# Patient Record
Sex: FEMALE | Race: WHITE | NOT HISPANIC OR LATINO | URBAN - NONMETROPOLITAN AREA
[De-identification: names, ages, dates, MRNs, and addresses within clinical notes are randomized per-mention and may not be internally consistent; named-entity substitution may affect disease eponyms.]

---

## 2021-04-02 ENCOUNTER — IMPORTED ENCOUNTER (OUTPATIENT)
Dept: URBAN - NONMETROPOLITAN AREA CLINIC 1 | Facility: CLINIC | Age: 31
End: 2021-04-02

## 2021-04-02 PROBLEM — H52.03: Noted: 2021-04-02

## 2021-04-02 PROCEDURE — 92310 CONTACT LENS FITTING OU: CPT

## 2021-04-02 PROCEDURE — 92015 DETERMINE REFRACTIVE STATE: CPT

## 2021-04-02 PROCEDURE — 92004 COMPRE OPH EXAM NEW PT 1/>: CPT

## 2021-04-02 NOTE — PATIENT DISCUSSION
Hyperopia OUDiscussed refractive status in detail with patient. New glasses and contact Rx given today. Discussed proper care replacement and hygiene. Continue to monitor.  Routine optos done today; normal ocular health

## 2021-04-05 ENCOUNTER — IMPORTED ENCOUNTER (OUTPATIENT)
Dept: URBAN - NONMETROPOLITAN AREA CLINIC 1 | Facility: CLINIC | Age: 31
End: 2021-04-05

## 2022-04-09 ASSESSMENT — VISUAL ACUITY
OD_SC: 20/25-
OD_SC: 20/25-
OS_SC: 20/29+
OS_SC: 20/29+

## 2022-04-09 ASSESSMENT — TONOMETRY
OD_IOP_MMHG: 12
OS_IOP_MMHG: 12